# Patient Record
Sex: MALE | Race: WHITE | NOT HISPANIC OR LATINO | Employment: FULL TIME | URBAN - METROPOLITAN AREA
[De-identification: names, ages, dates, MRNs, and addresses within clinical notes are randomized per-mention and may not be internally consistent; named-entity substitution may affect disease eponyms.]

---

## 2023-03-26 ENCOUNTER — OFFICE VISIT (OUTPATIENT)
Dept: URGENT CARE | Facility: URGENT CARE | Age: 58
End: 2023-03-26
Payer: COMMERCIAL

## 2023-03-26 VITALS
DIASTOLIC BLOOD PRESSURE: 64 MMHG | HEART RATE: 63 BPM | OXYGEN SATURATION: 96 % | SYSTOLIC BLOOD PRESSURE: 114 MMHG | TEMPERATURE: 97.2 F

## 2023-03-26 DIAGNOSIS — R22.31 NODULE OF FINGER, RIGHT: ICD-10-CM

## 2023-03-26 DIAGNOSIS — M79.89 FINGER SWELLING: Primary | ICD-10-CM

## 2023-03-26 DIAGNOSIS — L08.9 FINGER INFECTION: ICD-10-CM

## 2023-03-26 LAB
BASOPHILS # BLD AUTO: 0.1 10E3/UL (ref 0–0.2)
BASOPHILS NFR BLD AUTO: 1 %
EOSINOPHIL # BLD AUTO: 0.2 10E3/UL (ref 0–0.7)
EOSINOPHIL NFR BLD AUTO: 4 %
ERYTHROCYTE [DISTWIDTH] IN BLOOD BY AUTOMATED COUNT: 12.7 % (ref 10–15)
ERYTHROCYTE [SEDIMENTATION RATE] IN BLOOD BY WESTERGREN METHOD: 6 MM/HR (ref 0–20)
HCT VFR BLD AUTO: 43.5 % (ref 40–53)
HGB BLD-MCNC: 14.5 G/DL (ref 13.3–17.7)
LYMPHOCYTES # BLD AUTO: 1.6 10E3/UL (ref 0.8–5.3)
LYMPHOCYTES NFR BLD AUTO: 29 %
MCH RBC QN AUTO: 30.8 PG (ref 26.5–33)
MCHC RBC AUTO-ENTMCNC: 33.3 G/DL (ref 31.5–36.5)
MCV RBC AUTO: 92 FL (ref 78–100)
MONOCYTES # BLD AUTO: 0.8 10E3/UL (ref 0–1.3)
MONOCYTES NFR BLD AUTO: 14 %
NEUTROPHILS # BLD AUTO: 2.8 10E3/UL (ref 1.6–8.3)
NEUTROPHILS NFR BLD AUTO: 52 %
PLATELET # BLD AUTO: 184 10E3/UL (ref 150–450)
RBC # BLD AUTO: 4.71 10E6/UL (ref 4.4–5.9)
WBC # BLD AUTO: 5.4 10E3/UL (ref 4–11)

## 2023-03-26 PROCEDURE — 84550 ASSAY OF BLOOD/URIC ACID: CPT | Performed by: PHYSICIAN ASSISTANT

## 2023-03-26 PROCEDURE — 99204 OFFICE O/P NEW MOD 45 MIN: CPT | Performed by: PHYSICIAN ASSISTANT

## 2023-03-26 PROCEDURE — 85025 COMPLETE CBC W/AUTO DIFF WBC: CPT | Performed by: PHYSICIAN ASSISTANT

## 2023-03-26 PROCEDURE — 86140 C-REACTIVE PROTEIN: CPT | Performed by: PHYSICIAN ASSISTANT

## 2023-03-26 PROCEDURE — 85652 RBC SED RATE AUTOMATED: CPT | Performed by: PHYSICIAN ASSISTANT

## 2023-03-26 PROCEDURE — 36415 COLL VENOUS BLD VENIPUNCTURE: CPT | Performed by: PHYSICIAN ASSISTANT

## 2023-03-26 RX ORDER — SULFAMETHOXAZOLE/TRIMETHOPRIM 800-160 MG
1 TABLET ORAL
COMMUNITY
Start: 2023-03-20

## 2023-03-26 NOTE — PROGRESS NOTES
ASSESSMENT/PLAN:    (M79.89) Finger swelling  (primary encounter diagnosis)    MDM: Waxing and waning chronic episodes of redness, swelling, nodules, and open sores on multiple digits right hand, often associated with itching, in a 57 year old male for over one year. I suspect he may have an underlying dermatologic or rheumatologic etiology.     Additionally, I do suspect some secondary local infection of the skin and subcutaneous tissue surrounding the open lesions. As he has not had any response to the Bactrim prescribed by another provider 6 days ago. I have advised he may stop the Bactrim, and will try a 7 day course of Augmentin.      I did consider cellulitis today--although his history and exam is not highly suggestive for cellulitis. His CBD with differential is within normal limits. To start some screen for inflammatory conditions, I ordered a Sed Rate (which is normal). I also ordered a CRP and Uric Acid (which are still pending at this time). Criteria for urgent and emergent follow-up is reviewed.     I provided referrals for patient to be evaluated further by both dermatology and rheumatology in hopes this will help provide a definitive diagnoses and treatment for his prolonged symptoms.     All above is reviewed with patient and his wife. Additionally, I provided the below patient discharge summary and results of today's completed test results to hand carry to pending referral appointments.      Plan: CBC with Platelets & Differential, ESR:         Erythrocyte sedimentation rate, Uric acid, CRP,        inflammation, Adult Dermatology Referral, Adult        Rheumatology  Referral    Discharge Summary-    March 26, 2023 [facility] Plan:     As we discussed today, it seems more probable to me that the intermittent redness, swelling, nodules and open skin lesions on the fingers of your right hand, for the past 1 year to 13 months, is likely to be related to an underlying inflammatory skin  condition or an underlying rheumatologic condition.      In addition to the above, I do suspect you have some element of secondary bacterial skin infection surrounding your open skin lesions. For this reason, I have prescribed a 7 day course of Augmentin antibiotic. You can stop the Bactrim you were prescribed by another provider.     I have placed 2 referrals for you today (one with a dermatologist and one with a rheumatologist).     Your complete blood count results and one of the 2 inflammatory markers I ordered for you today (called a Sed Rate) are normal.     I have also ordered a uric acid blood test (screens for gout) and a second inflammatory marker blood test (called a CRP). Those results should be back in the next 2-3 days.     If you have any spreading or worsening of your current symptoms, if you develop fever, or new symptoms, you should be seen again for re-evaluation.       Results for orders placed or performed in visit on 03/26/23   ESR: Erythrocyte sedimentation rate     Status: Normal   Result Value Ref Range    Erythrocyte Sedimentation Rate 6 0 - 20 mm/hr   CBC with platelets and differential     Status: None   Result Value Ref Range    WBC Count 5.4 4.0 - 11.0 10e3/uL    RBC Count 4.71 4.40 - 5.90 10e6/uL    Hemoglobin 14.5 13.3 - 17.7 g/dL    Hematocrit 43.5 40.0 - 53.0 %    MCV 92 78 - 100 fL    MCH 30.8 26.5 - 33.0 pg    MCHC 33.3 31.5 - 36.5 g/dL    RDW 12.7 10.0 - 15.0 %    Platelet Count 184 150 - 450 10e3/uL    % Neutrophils 52 %    % Lymphocytes 29 %    % Monocytes 14 %    % Eosinophils 4 %    % Basophils 1 %    Absolute Neutrophils 2.8 1.6 - 8.3 10e3/uL    Absolute Lymphocytes 1.6 0.8 - 5.3 10e3/uL    Absolute Monocytes 0.8 0.0 - 1.3 10e3/uL    Absolute Eosinophils 0.2 0.0 - 0.7 10e3/uL    Absolute Basophils 0.1 0.0 - 0.2 10e3/uL   CBC with Platelets & Differential     Status: None    Narrative    The following orders were created for panel order CBC with Platelets &  Differential.  Procedure                               Abnormality         Status                     ---------                               -----------         ------                     CBC with platelets and d...[[id number]]                      Final result                 Please view results for these tests on the individual orders.       (R22.31) Nodule of finger, right  Plan: CBC with Platelets & Differential, ESR:         Erythrocyte sedimentation rate, Uric acid, CRP,        inflammation, Adult Dermatology Referral, Adult        Rheumatology  Referral    (L08.9) Finger infection  Plan: amoxicillin-clavulanate (AUGMENTIN) 875-125 MG         tablet    ------------------------------      Andrea Pete is a very pleasant 57 year old male who presents to urgent care today, accompanied by his wife, for evaluation of chronic, intermittent, episodes of right index finger, right middle finger, and sometimes right ring finger generalized swelling of entire finger, scattered hard nodules, and open sores.     Patient states the these symptoms have been coming and going for over one year. He reports his first episode was in February of 2022. He has been seen my multiple medical providers over the past 13 months or so, and has been prescribed multiple antibiotics (which he states sometimes seemed to help and sometimes did nothing). He is unable to recall the names of any of the antibiotics he has been prescribed--other than the one he was prescribed 6 days ago (Bactrim).     Patient states he developed another flare up about a week or so ago. He was seen by his wellness clinic provider and prescribed a 2 week course of Bactrim. He has been taking the Bactrim for 6 days now, and does not note any improvement, prompting his visit here today.     Other treatment tried: Aquaphor     Associated symptoms: Intermittently itchy. No finger joint pain. No purulent drainage. No associated loss of hand/finger function. No  fever or systemic symptoms. Patient sates he otherwise feels generally well.     No known prior history of gout, rheumatoid or other inflammatory arthritis, diabetes, or other known autoimmune disorders.     ROS:   CONSTITUTIONAL: No acute onset fever,chills or severe weakenss  CARDIAC: No acute onset chest pain or shortness of breath   RESP: No acute onset cough, chest pain or shortness of breath   GI: No acute onset abdominal pain. No acute onset nausea, vomiting or diarrhea   SKIN: positive as per HPI. No acute  rash or lesions elsewhere   NEURO: No acute onset headaches, neck stiffness or lethargy.   RHEUM: Positive as per above. No associated acute onset red, warm or swollen joints elewhere        No past medical history on file.    There is no problem list on file for this patient.    Current Outpatient Medications   Medication     sulfamethoxazole-trimethoprim (BACTRIM DS) 800-160 MG tablet     No current facility-administered medications for this visit.     No Known Allergies     OBJECTIVE:  /64   Pulse 63   Temp 97.2  F (36.2  C) (Tympanic)   SpO2 96%     GENERAL:  Very pleasant, comfortable and generally well appearing.    RIGHT HAND: Positive for erythematous skin right hand and fingers--primarily swelling is noted along the dorsal aspect, radial side and ulnar side of index, middle and ring finger. There are also scattered, firm, nodules on phalanges and scattered, superficial open lesions. No purulent or bloody drainage. No fluctuant pockets to incise and drain.     Patient demonstrates full range of motion right hand and all fingers of right hand. Good  strength is also demonstrated.     Sensation: Sensation to light touch is confirmed along palmar surface, dorsal surface, ulnar aspect, radial aspect, and fingertips of each digit.   Circulation. Good/brisk radial and brachial pulses.Good, brisk, CRT on each digiti of right hand.      NEURO: Alert and oriented.  Normal speech and mentation.   CN II/XII grossly intact.  Gait within normal limits.      LAB:     Results for orders placed or performed in visit on 03/26/23   ESR: Erythrocyte sedimentation rate     Status: Normal   Result Value Ref Range    Erythrocyte Sedimentation Rate 6 0 - 20 mm/hr   CBC with platelets and differential     Status: None   Result Value Ref Range    WBC Count 5.4 4.0 - 11.0 10e3/uL    RBC Count 4.71 4.40 - 5.90 10e6/uL    Hemoglobin 14.5 13.3 - 17.7 g/dL    Hematocrit 43.5 40.0 - 53.0 %    MCV 92 78 - 100 fL    MCH 30.8 26.5 - 33.0 pg    MCHC 33.3 31.5 - 36.5 g/dL    RDW 12.7 10.0 - 15.0 %    Platelet Count 184 150 - 450 10e3/uL    % Neutrophils 52 %    % Lymphocytes 29 %    % Monocytes 14 %    % Eosinophils 4 %    % Basophils 1 %    Absolute Neutrophils 2.8 1.6 - 8.3 10e3/uL    Absolute Lymphocytes 1.6 0.8 - 5.3 10e3/uL    Absolute Monocytes 0.8 0.0 - 1.3 10e3/uL    Absolute Eosinophils 0.2 0.0 - 0.7 10e3/uL    Absolute Basophils 0.1 0.0 - 0.2 10e3/uL   CBC with Platelets & Differential     Status: None    Narrative    The following orders were created for panel order CBC with Platelets & Differential.  Procedure                               Abnormality         Status                     ---------                               -----------         ------                     CBC with platelets and d...[[id number]]                      Final result                 Please view results for these tests on the individual orders.

## 2023-03-27 ENCOUNTER — TELEPHONE (OUTPATIENT)
Dept: FAMILY MEDICINE | Facility: CLINIC | Age: 58
End: 2023-03-27
Payer: COMMERCIAL

## 2023-03-27 LAB
CRP SERPL-MCNC: <3 MG/L
URATE SERPL-MCNC: 4 MG/DL (ref 3.4–7)

## 2023-03-27 NOTE — TELEPHONE ENCOUNTER
S/w wife and scheduled appt at  with Dr. Fernández on Wednesday 4/5 at 1:45 pm.  Wife will call back if appt does not work.    Nazia STRANGE RN  [facility]  [phone]

## 2023-03-27 NOTE — PATIENT INSTRUCTIONS
March 26, 2023 [facility] Plan:     As we discussed today, it seems more probable to me that the intermittent redness, swelling, nodules and open skin lesions on the fingers of your right hand, for the past 1 year to 13 months, is likely to be related to an underlying inflammatory skin condition or an underlying rheumatologic condition.      In addition to the above, I do suspect you have some element of secondary bacterial skin infection surrounding your open skin lesions. For this reason, I have prescribed a 7 day course of Augmentin antibiotic. You can stop the Bactrim you were prescribed by another provider.     I have placed 2 referrals for you today (one with a dermatologist and one with a rheumatologist).     Your complete blood count results and one of the 2 inflammatory markers I ordered for you today (called a Sed Rate) are normal.     I have also ordered a uric acid blood test (screens for gout) and a second inflammatory marker blood test (called a CRP). Those results should be back in the next 2-3 days.     If you have any spreading or worsening of your current symptoms, if you develop fever, or new symptoms, you should be seen again for re-evaluation.       Results for orders placed or performed in visit on 03/26/23   ESR: Erythrocyte sedimentation rate     Status: Normal   Result Value Ref Range    Erythrocyte Sedimentation Rate 6 0 - 20 mm/hr   CBC with platelets and differential     Status: None   Result Value Ref Range    WBC Count 5.4 4.0 - 11.0 10e3/uL    RBC Count 4.71 4.40 - 5.90 10e6/uL    Hemoglobin 14.5 13.3 - 17.7 g/dL    Hematocrit 43.5 40.0 - 53.0 %    MCV 92 78 - 100 fL    MCH 30.8 26.5 - 33.0 pg    MCHC 33.3 31.5 - 36.5 g/dL    RDW 12.7 10.0 - 15.0 %    Platelet Count 184 150 - 450 10e3/uL    % Neutrophils 52 %    % Lymphocytes 29 %    % Monocytes 14 %    % Eosinophils 4 %    % Basophils 1 %    Absolute Neutrophils 2.8 1.6 - 8.3 10e3/uL    Absolute Lymphocytes 1.6 0.8 - 5.3  10e3/uL    Absolute Monocytes 0.8 0.0 - 1.3 10e3/uL    Absolute Eosinophils 0.2 0.0 - 0.7 10e3/uL    Absolute Basophils 0.1 0.0 - 0.2 10e3/uL   CBC with Platelets & Differential     Status: None    Narrative    The following orders were created for panel order CBC with Platelets & Differential.  Procedure                               Abnormality         Status                     ---------                               -----------         ------                     CBC with platelets and d...[[id number]]                      Final result                 Please view results for these tests on the individual orders.

## 2023-03-27 NOTE — TELEPHONE ENCOUNTER
This encounter is being sent to inform the clinic that this patient has a referral from PATRICK SANCHEZ for the diagnoses of M79.89 (ICD-10-CM) - Finger swelling /R22.31 (ICD-10-CM) - Nodule of finger, right and has requested that this patient be seen within 1-2 Weeks.  Based on the availability of our provider(s), we are unable to accommodate this request.      Were all sites offered this patient?  Yes      Does scheduling algorithm request to schedule next available?  Patient appointment has not been scheduled.  Please review the referral request for accommodation and contact the patient.  If unable to accommodate, please resubmit a referral and indicate a preferred partner or affiliate location using Provider Finder or Scheduling Instructions field.      Next available appointment in July. Okay to call Pt or wife with next available appointment scheduled and leave voicemail with details.

## 2023-04-05 ENCOUNTER — OFFICE VISIT (OUTPATIENT)
Dept: DERMATOLOGY | Facility: CLINIC | Age: 58
End: 2023-04-05
Payer: COMMERCIAL

## 2023-04-05 DIAGNOSIS — R21 RASH: Primary | ICD-10-CM

## 2023-04-05 DIAGNOSIS — L29.9 PRURITUS: ICD-10-CM

## 2023-04-05 PROCEDURE — 36415 COLL VENOUS BLD VENIPUNCTURE: CPT | Performed by: STUDENT IN AN ORGANIZED HEALTH CARE EDUCATION/TRAINING PROGRAM

## 2023-04-05 PROCEDURE — 86038 ANTINUCLEAR ANTIBODIES: CPT | Performed by: STUDENT IN AN ORGANIZED HEALTH CARE EDUCATION/TRAINING PROGRAM

## 2023-04-05 PROCEDURE — 99204 OFFICE O/P NEW MOD 45 MIN: CPT | Performed by: STUDENT IN AN ORGANIZED HEALTH CARE EDUCATION/TRAINING PROGRAM

## 2023-04-05 PROCEDURE — 86431 RHEUMATOID FACTOR QUANT: CPT | Performed by: STUDENT IN AN ORGANIZED HEALTH CARE EDUCATION/TRAINING PROGRAM

## 2023-04-05 RX ORDER — CLOBETASOL PROPIONATE 0.5 MG/G
OINTMENT TOPICAL 2 TIMES DAILY
Qty: 60 G | Refills: 1 | Status: SHIPPED | OUTPATIENT
Start: 2023-04-05

## 2023-04-05 ASSESSMENT — PAIN SCALES - GENERAL: PAINLEVEL: NO PAIN (0)

## 2023-04-05 NOTE — LETTER
4/5/2023         RE: Andrea Pete  [address]  [place] [address]        Dear Colleague,    Thank you for referring your patient, Andrea Pete, to the [facility]. Please see a copy of my visit note below.    [facility] Dermatology Note    Encounter Date: Apr 5, 2023    Dermatology Problem List:  -   ______________________________________    Impression/Plan:  Andrea was seen today for derm problem.    Diagnoses and all orders for this visit:    Pruritus  Rash  -     clobetasol (TEMOVATE) 0.05 % external ointment; Apply topically 2 times daily  -     Rheumatoid factor  -     Anti Nuclear Desiree IgG by IFA with Reflex  -Patient presenting for recurrent erythematous edematous pruritic plaques on dorsal fingers of the right hand which have recurred intermittently typically during colder months here in [place] in February November and March  - The rashes typically last several days to weeks typically these presented to different doctors offices and been given antibiotics and the lesions improved.  It is unclear whether the lesions improved because of the antibiotics or because they are going to improve anyway  - Discussed the uncertainty related to the diagnosis given that its not active today, note placed in the blue sticky of his chart to allow patient to be worked in immediately should the rash recur so we can biopsy a fresh lesion  - However based on the history, and the appearance of the lesions and pictures on the patient's phone I think that the diagnosis is most consistent with pernio  - Patient does endorse a history of frequently being outside and doing activities during which his hands are frequently cold  - Discussed idiopathic perniosis versus perniosis associated with systemic diseases, patient's review of systems is negative for associated conditions like lupus rheumatoid arthritis etc.  - Rheumatoid factor and DEMETRIUS, if negative reassuring that  more worrisome things are not going on, however if positive will change how we think about the case very much because patient lacks the clinical symptoms consistent with conditions in which those antibodies are positive  -Interesting feature of this case said that the lesions have been persistently confined to the right hand, on exam today the right hand is obviously colder than the left hand.  Patient does deny symptoms of Raynaud's phenomenon and nailfold capillaries are normal under dermoscopy    Other orders  -     Adult Dermatology Referral          Follow-up when rash is active.       Staff Involved:  Staff Only    Xander Fernández MD   of Dermatology  Department of Dermatology  [facility]      CC:   Chief Complaint   Patient presents with     Derm Problem       History of Present Illness:  Mr. Andrea Pete is a 57 year old male who presents as a new patient.    Waxing and waning swelling and redness of fingers     In February of 2022 had episode of swelling and ws given antibiotcis and it cleared up.     Had another episode that appeared in November and was treated w/ bactrimn in December    Most recent episode in march was given 2 weeks of antibiotics     Labs:      Physical exam:  Vitals: There were no vitals taken for this visit.  GEN: well developed, well-nourished, in no acute distress, in a pleasant mood.     SKIN: Marino phototype 1  - Full skin, which includes the head/face, both arms, chest, back, abdomen,both legs, genitalia and/or groin buttocks, digits and/or nails, was examined.  -Acral cyanosis right hand, with postinflammatory pigment alteration on the dorsal aspects of the right hand where the rash was previously  - No other lesions of concern on areas examined.     Past Medical History:   History reviewed. No pertinent past medical history.  No past surgical history on file.    Social History:   reports that he has never smoked. He has  never used smokeless tobacco.    Family History:  History reviewed. No pertinent family history.    Medications:  Current Outpatient Medications   Medication Sig Dispense Refill     clobetasol (TEMOVATE) 0.05 % external ointment Apply topically 2 times daily 60 g 1     sulfamethoxazole-trimethoprim (BACTRIM DS) 800-160 MG tablet Take 1 tablet by mouth 2 times daily (Patient not taking: Reported on 4/5/2023)       No Known Allergies                Again, thank you for allowing me to participate in the care of your patient.        Sincerely,        Xander Fernández MD

## 2023-04-05 NOTE — PROGRESS NOTES
[facility] Dermatology Note    Encounter Date: Apr 5, 2023    Dermatology Problem List:  -   ______________________________________    Impression/Plan:  Andrea was seen today for derm problem.    Diagnoses and all orders for this visit:    Pruritus  Rash  -     clobetasol (TEMOVATE) 0.05 % external ointment; Apply topically 2 times daily  -     Rheumatoid factor  -     Anti Nuclear Desiree IgG by IFA with Reflex  -Patient presenting for recurrent erythematous edematous pruritic plaques on dorsal fingers of the right hand which have recurred intermittently typically during colder months here in [place] in February November and March  - The rashes typically last several days to weeks typically these presented to different doctors offices and been given antibiotics and the lesions improved.  It is unclear whether the lesions improved because of the antibiotics or because they are going to improve anyway  - Discussed the uncertainty related to the diagnosis given that its not active today, note placed in the blue sticky of his chart to allow patient to be worked in immediately should the rash recur so we can biopsy a fresh lesion  - However based on the history, and the appearance of the lesions and pictures on the patient's phone I think that the diagnosis is most consistent with pernio  - Patient does endorse a history of frequently being outside and doing activities during which his hands are frequently cold  - Discussed idiopathic perniosis versus perniosis associated with systemic diseases, patient's review of systems is negative for associated conditions like lupus rheumatoid arthritis etc.  - Rheumatoid factor and DEMETRIUS, if negative reassuring that more worrisome things are not going on, however if positive will change how we think about the case very much because patient lacks the clinical symptoms consistent with conditions in which those antibodies are positive  -Interesting feature of this case  said that the lesions have been persistently confined to the right hand, on exam today the right hand is obviously colder than the left hand.  Patient does deny symptoms of Raynaud's phenomenon and nailfold capillaries are normal under dermoscopy    Other orders  -     Adult Dermatology Referral          Follow-up when rash is active.       Staff Involved:  Staff Only    Xander Fernández MD   of Dermatology  Department of Dermatology  [facility]      CC:   Chief Complaint   Patient presents with     Derm Problem       History of Present Illness:  Mr. Andrea Pete is a 57 year old male who presents as a new patient.    Waxing and waning swelling and redness of fingers     In February of 2022 had episode of swelling and ws given antibiotcis and it cleared up.     Had another episode that appeared in November and was treated w/ bactrimn in December    Most recent episode in march was given 2 weeks of antibiotics     Labs:      Physical exam:  Vitals: There were no vitals taken for this visit.  GEN: well developed, well-nourished, in no acute distress, in a pleasant mood.     SKIN: Marino phototype 1  - Full skin, which includes the head/face, both arms, chest, back, abdomen,both legs, genitalia and/or groin buttocks, digits and/or nails, was examined.  -Acral cyanosis right hand, with postinflammatory pigment alteration on the dorsal aspects of the right hand where the rash was previously  - No other lesions of concern on areas examined.     Past Medical History:   History reviewed. No pertinent past medical history.  No past surgical history on file.    Social History:   reports that he has never smoked. He has never used smokeless tobacco.    Family History:  History reviewed. No pertinent family history.    Medications:  Current Outpatient Medications   Medication Sig Dispense Refill     clobetasol (TEMOVATE) 0.05 % external ointment Apply topically 2 times daily  60 g 1     sulfamethoxazole-trimethoprim (BACTRIM DS) 800-160 MG tablet Take 1 tablet by mouth 2 times daily (Patient not taking: Reported on 4/5/2023)       No Known Allergies

## 2023-04-05 NOTE — PATIENT INSTRUCTIONS
"Your rash looks and sounds consistent with a condition called perniosis or chillblains. This is a benign condition often triggered by exposure to moisture and cold. It can typically be managed by keeping your hands dry and warm and use of topical steroids as necessary. If and when you develop another flare, message me right away on mychart and say you would like to be worked in for a biopsy per the \"blue sticky\" in your chart.     Rarely perniosis can be associated with autoimmune conditions. You do not have any symptoms that are consistent with autoimmune disease. We have ordered some blood work, that if negative will be very reassuring of that. If positive, it wont change how suspicious we are for autoimmune disease at this time given you lack the associated systemic symptoms   "

## 2023-04-06 LAB
ANA SER QL IF: NEGATIVE
RHEUMATOID FACT SER NEPH-ACNC: <7 IU/ML

## 2023-08-26 ENCOUNTER — HEALTH MAINTENANCE LETTER (OUTPATIENT)
Age: 58
End: 2023-08-26

## 2024-10-19 ENCOUNTER — HEALTH MAINTENANCE LETTER (OUTPATIENT)
Age: 59
End: 2024-10-19

## 2024-10-25 SDOH — HEALTH STABILITY: PHYSICAL HEALTH: ON AVERAGE, HOW MANY DAYS PER WEEK DO YOU ENGAGE IN MODERATE TO STRENUOUS EXERCISE (LIKE A BRISK WALK)?: 5 DAYS

## 2024-10-25 SDOH — HEALTH STABILITY: PHYSICAL HEALTH: ON AVERAGE, HOW MANY MINUTES DO YOU ENGAGE IN EXERCISE AT THIS LEVEL?: 20 MIN

## 2024-10-25 ASSESSMENT — SOCIAL DETERMINANTS OF HEALTH (SDOH): HOW OFTEN DO YOU GET TOGETHER WITH FRIENDS OR RELATIVES?: ONCE A WEEK

## 2024-10-30 ENCOUNTER — OFFICE VISIT (OUTPATIENT)
Dept: INTERNAL MEDICINE | Facility: CLINIC | Age: 59
End: 2024-10-30
Payer: COMMERCIAL

## 2024-10-30 VITALS
BODY MASS INDEX: 26.76 KG/M2 | TEMPERATURE: 97.8 F | OXYGEN SATURATION: 98 % | HEART RATE: 58 BPM | SYSTOLIC BLOOD PRESSURE: 120 MMHG | WEIGHT: 180.7 LBS | HEIGHT: 69 IN | DIASTOLIC BLOOD PRESSURE: 80 MMHG

## 2024-10-30 DIAGNOSIS — Z13.220 LIPID SCREENING: ICD-10-CM

## 2024-10-30 DIAGNOSIS — R13.19 ESOPHAGEAL DYSPHAGIA: ICD-10-CM

## 2024-10-30 DIAGNOSIS — R79.89 ELEVATED TSH: ICD-10-CM

## 2024-10-30 DIAGNOSIS — Z13.1 SCREENING FOR DIABETES MELLITUS: ICD-10-CM

## 2024-10-30 DIAGNOSIS — Z11.59 NEED FOR HEPATITIS C SCREENING TEST: ICD-10-CM

## 2024-10-30 DIAGNOSIS — Z23 NEED FOR PROPHYLACTIC VACCINATION AND INOCULATION AGAINST INFLUENZA: ICD-10-CM

## 2024-10-30 DIAGNOSIS — Z00.00 ROUTINE GENERAL MEDICAL EXAMINATION AT A HEALTH CARE FACILITY: Primary | ICD-10-CM

## 2024-10-30 LAB
ANION GAP SERPL CALCULATED.3IONS-SCNC: 12 MMOL/L (ref 7–15)
BUN SERPL-MCNC: 16.2 MG/DL (ref 8–23)
CALCIUM SERPL-MCNC: 9.4 MG/DL (ref 8.8–10.4)
CHLORIDE SERPL-SCNC: 102 MMOL/L (ref 98–107)
CHOLEST SERPL-MCNC: 169 MG/DL
CREAT SERPL-MCNC: 1.13 MG/DL (ref 0.67–1.17)
EGFRCR SERPLBLD CKD-EPI 2021: 74 ML/MIN/1.73M2
FASTING STATUS PATIENT QL REPORTED: YES
FASTING STATUS PATIENT QL REPORTED: YES
GLUCOSE SERPL-MCNC: 98 MG/DL (ref 70–99)
HCO3 SERPL-SCNC: 25 MMOL/L (ref 22–29)
HCV AB SERPL QL IA: NONREACTIVE
HDLC SERPL-MCNC: 58 MG/DL
LDLC SERPL CALC-MCNC: 99 MG/DL
NONHDLC SERPL-MCNC: 111 MG/DL
POTASSIUM SERPL-SCNC: 4.6 MMOL/L (ref 3.4–5.3)
SODIUM SERPL-SCNC: 139 MMOL/L (ref 135–145)
T4 FREE SERPL-MCNC: 1.12 NG/DL (ref 0.9–1.7)
TRIGL SERPL-MCNC: 58 MG/DL
TSH SERPL DL<=0.005 MIU/L-ACNC: 4.83 UIU/ML (ref 0.3–4.2)

## 2024-10-30 PROCEDURE — 91320 SARSCV2 VAC 30MCG TRS-SUC IM: CPT | Performed by: INTERNAL MEDICINE

## 2024-10-30 PROCEDURE — 86803 HEPATITIS C AB TEST: CPT | Performed by: INTERNAL MEDICINE

## 2024-10-30 PROCEDURE — 90480 ADMN SARSCOV2 VAC 1/ONLY CMP: CPT | Performed by: INTERNAL MEDICINE

## 2024-10-30 PROCEDURE — 84443 ASSAY THYROID STIM HORMONE: CPT | Performed by: INTERNAL MEDICINE

## 2024-10-30 PROCEDURE — 80061 LIPID PANEL: CPT | Performed by: INTERNAL MEDICINE

## 2024-10-30 PROCEDURE — 80048 BASIC METABOLIC PNL TOTAL CA: CPT | Performed by: INTERNAL MEDICINE

## 2024-10-30 PROCEDURE — 36415 COLL VENOUS BLD VENIPUNCTURE: CPT | Performed by: INTERNAL MEDICINE

## 2024-10-30 PROCEDURE — 99396 PREV VISIT EST AGE 40-64: CPT | Mod: 25 | Performed by: INTERNAL MEDICINE

## 2024-10-30 PROCEDURE — 84439 ASSAY OF FREE THYROXINE: CPT | Performed by: INTERNAL MEDICINE

## 2024-10-30 PROCEDURE — 90673 RIV3 VACCINE NO PRESERV IM: CPT | Performed by: INTERNAL MEDICINE

## 2024-10-30 PROCEDURE — 90471 IMMUNIZATION ADMIN: CPT | Performed by: INTERNAL MEDICINE

## 2024-10-30 NOTE — PROGRESS NOTES
"Preventive Care Visit  [facility]  Trung Castellanos MD, Internal Medicine  Oct 30, 2024      Assessment & Plan     Routine general medical examination at a health care facility  Updated screening, immunizations, prevention.  Please see health maintenance list, care gaps   Will schedule colonoscopy thru [facility]-     Esophageal dysphagia  Several yr hx of intermittent dysphagia. No GERD.  Get EGD to evaluate this   - Adult GI  Referral - Procedure Only; Future    Elevated TSH  Noted in 2023- no symptoms - if elevated might be subclinical hypothryoidism  - TSH with free T4 reflex; Future  - TSH with free T4 reflex    Lipid screening  - Lipid panel reflex to direct LDL Fasting; Future  - Lipid panel reflex to direct LDL Fasting    Screening for diabetes mellitus  - Basic metabolic panel; Future  - Basic metabolic panel    Need for prophylactic vaccination and inoculation against influenza      Need for hepatitis C screening test  - Hepatitis C Screen Reflex to HCV RNA Quant and Genotype; Future  - Hepatitis C Screen Reflex to HCV RNA Quant and Genotype    Patient has been advised of split billing requirements and indicates understanding: Yes        BMI  Estimated body mass index is 26.68 kg/m  as calculated from the following:    Height as of this encounter: 1.753 m (5' 9\").    Weight as of this encounter: 82 kg (180 lb 11.2 oz).       Counseling  Appropriate preventive services were addressed with this patient via screening, questionnaire, or discussion as appropriate for fall prevention, nutrition, physical activity, Tobacco-use cessation, social engagement, weight loss and cognition.  Checklist reviewing preventive services available has been given to the patient.  Reviewed patient's diet, addressing concerns and/or questions.   He is at risk for psychosocial distress and has been provided with information to reduce risk.       Regular exercise  See Patient Instructions    Subjective "   Andrea is a 59 year old, presenting for the following:  Physical and Imm/Inj (Flu Shot)        10/30/2024    10:38 AM   Additional Questions   Roomed by Jacinta PASCUAL CMA   Accompanied by self          HPI  No complaints  Previously seen at [facility]  Has had some intermittent dysphagia since 2022. No other red flag sx- no heartburn.   Due for colonoscopy- has order from [facility] to schedule.           Health Care Directive  Patient does not have a Health Care Directive: Discussed advance care planning with patient; information given to patient to review.      10/25/2024   General Health   How would you rate your overall physical health? Good   Feel stress (tense, anxious, or unable to sleep) Only a little      (!) STRESS CONCERN      10/25/2024   Nutrition   Three or more servings of calcium each day? Yes   Diet: Regular (no restrictions)    Low fat/cholesterol   How many servings of fruit and vegetables per day? 4 or more   How many sweetened beverages each day? 0-1       Multiple values from one day are sorted in reverse-chronological order         10/25/2024   Exercise   Days per week of moderate/strenous exercise 5 days   Average minutes spent exercising at this level 20 min            10/25/2024   Social Factors   Frequency of gathering with friends or relatives Once a week   Worry food won't last until get money to buy more No   Food not last or not have enough money for food? No   Do you have housing? (Housing is defined as stable permanent housing and does not include staying ouside in a car, in a tent, in an abandoned building, in an overnight shelter, or couch-surfing.) Yes   Are you worried about losing your housing? No   Lack of transportation? No   Unable to get utilities (heat,electricity)? No            10/25/2024   Fall Risk   Fallen 2 or more times in the past year? No    Trouble with walking or balance? No        Patient-reported          10/25/2024   Dental   Dentist two times every year?  "Yes            10/25/2024   TB Screening   Were you born outside of the [place]? No            Today's PHQ-2 Score:       10/29/2024     7:06 PM   PHQ-2 ( 1999 [employer])   Q1: Little interest or pleasure in doing things 0    Q2: Feeling down, depressed or hopeless 0    PHQ-2 Score 0    Q1: Little interest or pleasure in doing things Not at all   Q2: Feeling down, depressed or hopeless Not at all   PHQ-2 Score 0       Patient-reported           10/25/2024   Substance Use   Alcohol more than 3/day or more than 7/wk No   Do you use any other substances recreationally? No        Social History     Tobacco Use    Smoking status: Never    Smokeless tobacco: Never   Vaping Use    Vaping status: Never Used   Substance Use Topics    Alcohol use: Not Currently    Drug use: Never             10/25/2024   One time HIV Screening   Previous HIV test? I don't know          10/25/2024   STI Screening   New sexual partner(s) since last STI/HIV test? No      Last PSA: No results found for: \"PSA\"  ASCVD Risk   The ASCVD Risk score (Tom TOVAR, et al., 2019) failed to calculate for the following reasons:    Cannot find a previous HDL lab    Cannot find a previous total cholesterol lab           Reviewed and updated as needed this visit by Provider   Tobacco     Med Hx  Surg Hx  Fam Hx  Soc Hx Sexual Activity          Lab work is in process  Labs reviewed in EPIC         Objective    Exam  /80   Pulse 58   Temp 97.8  F (36.6  C) (Temporal)   Ht 1.753 m (5' 9\")   Wt 82 kg (180 lb 11.2 oz)   SpO2 98%   BMI 26.68 kg/m     Estimated body mass index is 26.68 kg/m  as calculated from the following:    Height as of this encounter: 1.753 m (5' 9\").    Weight as of this encounter: 82 kg (180 lb 11.2 oz).    Physical Exam  GENERAL: alert and no distress  EYES: Eyes grossly normal to inspection, PERRL and conjunctivae and sclerae normal  HENT: ear canals and TM's normal, nose and mouth without ulcers or lesions  NECK: no " adenopathy, no asymmetry, masses, or scars  RESP: lungs clear to auscultation - no rales, rhonchi or wheezes  CV: regular rate and rhythm, normal S1 S2, no S3 or S4, no murmur, click or rub, no peripheral edema  ABDOMEN: soft, nontender, no hepatosplenomegaly, no masses and bowel sounds normal  MS: no gross musculoskeletal defects noted, no edema  SKIN: no suspicious lesions or rashes  NEURO: Normal strength and tone, mentation intact and speech normal  PSYCH: mentation appears normal, affect normal/bright  LYMPH: no cervical adenopathy        Signed Electronically by: Trung Castellanos MD

## 2024-10-30 NOTE — PATIENT INSTRUCTIONS
Patient Education   Preventive Care Advice   This is general advice given by our system to help you stay healthy. However, your care team may have specific advice just for you. Please talk to your care team about your preventive care needs.  Nutrition  Eat 5 or more servings of fruits and vegetables each day.  Try wheat bread, brown rice and whole grain pasta (instead of white bread, rice, and pasta).  Get enough calcium and vitamin D. Check the label on foods and aim for 100% of the RDA (recommended daily allowance).  Lifestyle  Exercise at least 150 minutes each week  (30 minutes a day, 5 days a week).  Do muscle strengthening activities 2 days a week. These help control your weight and prevent disease.  No smoking.  Wear sunscreen to prevent skin cancer.  Have a dental exam and cleaning every 6 months.  Yearly exams  See your health care team every year to talk about:  Any changes in your health.  Any medicines your care team has prescribed.  Preventive care, family planning, and ways to prevent chronic diseases.  Shots (vaccines)   HPV shots (up to age 26), if you've never had them before.  Hepatitis B shots (up to age 59), if you've never had them before.  COVID-19 shot: Get this shot when it's due.  Flu shot: Get a flu shot every year.  Tetanus shot: Get a tetanus shot every 10 years.  Pneumococcal, hepatitis A, and RSV shots: Ask your care team if you need these based on your risk.  Shingles shot (for age 50 and up)  General health tests  Diabetes screening:  Starting at age 35, Get screened for diabetes at least every 3 years.  If you are younger than age 35, ask your care team if you should be screened for diabetes.  Cholesterol test: At age 39, start having a cholesterol test every 5 years, or more often if advised.  Bone density scan (DEXA): At age 50, ask your care team if you should have this scan for osteoporosis (brittle bones).  Hepatitis C: Get tested at least once in your life.  STIs (sexually  transmitted infections)  Before age 24: Ask your care team if you should be screened for STIs.  After age 24: Get screened for STIs if you're at risk. You are at risk for STIs (including HIV) if:  You are sexually active with more than one person.  You don't use condoms every time.  You or a partner was diagnosed with a sexually transmitted infection.  If you are at risk for HIV, ask about PrEP medicine to prevent HIV.  Get tested for HIV at least once in your life, whether you are at risk for HIV or not.  Cancer screening tests  Cervical cancer screening: If you have a cervix, begin getting regular cervical cancer screening tests starting at age 21.  Breast cancer scan (mammogram): If you've ever had breasts, begin having regular mammograms starting at age 40. This is a scan to check for breast cancer.  Colon cancer screening: It is important to start screening for colon cancer at age 45.  Have a colonoscopy test every 10 years (or more often if you're at risk) Or, ask your provider about stool tests like a FIT test every year or Cologuard test every 3 years.  To learn more about your testing options, visit:   .  For help making a decision, visit:   https://bit.ly/do40899.  Prostate cancer screening test: If you have a prostate, ask your care team if a prostate cancer screening test (PSA) at age 55 is right for you.  Lung cancer screening: If you are a current or former smoker ages 50 to 80, ask your care team if ongoing lung cancer screenings are right for you.  For informational purposes only. Not to replace the advice of your health care provider. Copyright   2023 [employer]. All rights reserved. Clinically reviewed by the [facility] Transitions Program. Swapbox [id number] - REV 01/24.

## 2024-11-23 ENCOUNTER — MYC MEDICAL ADVICE (OUTPATIENT)
Dept: INTERNAL MEDICINE | Facility: CLINIC | Age: 59
End: 2024-11-23
Payer: COMMERCIAL

## 2024-11-23 DIAGNOSIS — N52.9 IMPOTENCE OF ORGANIC ORIGIN: Primary | ICD-10-CM

## 2024-12-02 RX ORDER — SILDENAFIL 50 MG/1
50 TABLET, FILM COATED ORAL DAILY PRN
Qty: 30 TABLET | Refills: 5 | Status: SHIPPED | OUTPATIENT
Start: 2024-12-02

## 2025-01-31 ENCOUNTER — TRANSFERRED RECORDS (OUTPATIENT)
Dept: HEALTH INFORMATION MANAGEMENT | Facility: CLINIC | Age: 60
End: 2025-01-31
Payer: COMMERCIAL

## 2025-02-17 ENCOUNTER — VIRTUAL VISIT (OUTPATIENT)
Dept: INTERNAL MEDICINE | Facility: CLINIC | Age: 60
End: 2025-02-17
Payer: COMMERCIAL

## 2025-02-17 DIAGNOSIS — K20.0 EOSINOPHILIC ESOPHAGITIS: Primary | ICD-10-CM

## 2025-02-17 PROCEDURE — 98006 SYNCH AUDIO-VIDEO EST MOD 30: CPT | Performed by: INTERNAL MEDICINE

## 2025-02-17 RX ORDER — OMEPRAZOLE 20 MG/1
20 CAPSULE, DELAYED RELEASE ORAL 2 TIMES DAILY
Qty: 180 CAPSULE | Refills: 0 | Status: SHIPPED | OUTPATIENT
Start: 2025-02-17 | End: 2025-05-18

## 2025-02-17 NOTE — PROGRESS NOTES
Andrea is a 59 year old who is being evaluated via a billable video visit.    How would you like to obtain your AVS? MyChart  If the video visit is dropped, the invitation should be resent by: Text to cell phone: [phone]  Will anyone else be joining your video visit? No      Assessment & Plan     Eosinophilic esophagitis  Start PPI x 8 weeks dosing twice daily.  Repeat EGD at that time.  If normal consider continued long-term PPI use.  If significant eosinophilia  on biopsies remains then discuss either  food elimination diets versus drug  - omeprazole (PRILOSEC) 20 MG DR capsule; Take 1 capsule (20 mg) by mouth 2 times daily.  - Adult GI  Referral - Procedure Only; Future    See Patient Instructions    The longitudinal plan of care for the diagnosis(es)/condition(s) as documented were addressed during this visit. Due to the added complexity in care, I will continue to support Andrea in the subsequent management and with ongoing continuity of care.    Subjective   Andrea is a 59 year old, presenting for the following health issues:  Follow Up (Endoscopy )    History of Present Illness       Reason for visit:  Follow up from endoscopy that showed food alergies    He eats 4 or more servings of fruits and vegetables daily.He consumes 1 sweetened beverage(s) daily.He exercises with enough effort to increase his heart rate 20 to 29 minutes per day.  He exercises with enough effort to increase his heart rate 4 days per week.   He is taking medications regularly.                   Objective           Vitals:  No vitals were obtained today due to virtual visit.    Physical Exam   GENERAL: alert and no distress          Video-Visit Details    Type of service:  Video Visit   Originating Location (pt. Location): Home    Distant Location (provider location):  On-site  Platform used for Video Visit: Delonte  Signed Electronically by: Trung Castellanos MD

## 2025-02-20 ENCOUNTER — TELEPHONE (OUTPATIENT)
Dept: GASTROENTEROLOGY | Facility: CLINIC | Age: 60
End: 2025-02-20
Payer: COMMERCIAL

## 2025-02-20 ENCOUNTER — HOSPITAL ENCOUNTER (OUTPATIENT)
Facility: CLINIC | Age: 60
End: 2025-02-20
Attending: INTERNAL MEDICINE | Admitting: INTERNAL MEDICINE
Payer: COMMERCIAL

## 2025-02-20 NOTE — TELEPHONE ENCOUNTER
"Endoscopy Scheduling Screen    Have you had any respiratory illness or flu-like symptoms in the last 10 days?  No    What is your communication preference for Instructions and/or Bowel Prep?   MyChart    What insurance is in the chart?  Other:      Ordering/Referring Provider:   PRINCE LAWTON        (If ordering provider performs procedure, schedule with ordering provider unless otherwise instructed. )    BMI: Estimated body mass index is 26.68 kg/m  as calculated from the following:    Height as of 10/30/24: 1.753 m (5' 9\").    Weight as of 10/30/24: 82 kg (180 lb 11.2 oz).     Sedation Ordered  moderate sedation.   If patient BMI > 50 do not schedule in [facility].    If patient BMI > 45 do not schedule at [facility].    Are you taking methadone or Suboxone?  NO, No RN review required.    Have you been diagnosed and are being treated for severe PTSD or severe anxiety?  NO, No RN review required.    Are you taking any prescription medications for pain 3 or more times per week?   NO, No RN review required.    Do you have a history of malignant hyperthermia?  No    (Females) Are you currently pregnant?   No     Have you been diagnosed or told you have pulmonary hypertension?   No    Do you have an LVAD?  No    Have you been told you have moderate to severe sleep apnea?  No.    Have you been told you have COPD, asthma, or any other lung disease?  No    Do you  have a history of any heart conditions or any upcoming cardiac exams like an echo, angiogram, stress test, or ablation?  No     Have you ever had or are you waiting for an organ transplant?  No. Continue scheduling, no site restrictions.    Have you had a stroke or transient ischemic attack (TIA aka \"mini stroke\") in the last 2 years?   No.    Have you been diagnosed with or been told you have cirrhosis of the liver?   No.    Are you currently on dialysis?   No    Do you need assistance transferring?   No    BMI: Estimated body mass index is 26.68 kg/m  as calculated " "from the following:    Height as of 10/30/24: 1.753 m (5' 9\").    Weight as of 10/30/24: 82 kg (180 lb 11.2 oz).     Is patients BMI > 40 and scheduling location [facility]?  No    Do you take an injectable or oral medication for weight loss or diabetes (excluding insulin)?  No    Do you take the medication Naltrexone?  No    Do you take blood thinners?  No       Prep   Are you currently on dialysis or do you have chronic kidney disease?  No    Do you have a diagnosis of diabetes?  No    Do you have a diagnosis of cystic fibrosis (CF)?  No    On a regular basis do you go 3 -5 days between bowel movements?  No    BMI > 40?  No    Preferred Pharmacy:     [id number] - [place], [place] - [address] AT [facility] [address]  [address] [address]  Phone: [phone] Fax: [phone]      Final Scheduling Details     Procedure scheduled  Upper endoscopy (EGD)    Surgeon:  MARIANNA     Date of procedure:  04/18/2025     Pre-OP / PAC:   No - Not required for this site.    Location  [facility] - Per order.    Sedation   Moderate Sedation - Per order.      Patient Reminders:   You will receive a call from a Nurse to review instructions and health history.  This assessment must be completed prior to your procedure.  Failure to complete the Nurse assessment may result in the procedure being cancelled.      On the day of your procedure, please designate an adult(s) who can drive you home stay with you for the next 24 hours. The medicines used in the exam will make you sleepy. You will not be able to drive.      You cannot take public transportation, ride share services, or non-medical taxi service without a responsible caregiver.  Medical transport services are allowed with the requirement that a responsible caregiver will receive you at your destination.  We require that drivers and caregivers are confirmed prior to your procedure.  "

## 2025-03-05 ENCOUNTER — TELEPHONE (OUTPATIENT)
Dept: GASTROENTEROLOGY | Facility: CLINIC | Age: 60
End: 2025-03-05
Payer: COMMERCIAL

## 2025-03-05 NOTE — TELEPHONE ENCOUNTER
Caller: Andrea Pete     Reason for Reschedule/Cancellation (please be detailed, any staff messages or encounters to note?):   Patient spoke with PCP and decided not to proceed at this time    Did you cancel or rescheduled an EUS procedure? No.    Is screening questionnaire older than 3 months from the reschedule date.   If Yes, please complete screening questionnaire. No    Prior to reschedule please review:  Ordering Provider: PRINCE LAWTON   Sedation Determined: Moderate  Does patient have any ASC Exclusions, please identify?: N    Notes on Cancelled Procedure:  Procedure: Upper Endoscopy [EGD]   Date: 4/18/2025  Location: [facility]; [address] [place] [address]  Surgeon: MARIANNA    Rescheduled: No, Patient spoke with PCP and decided to not proceed with doing the procedure at this time

## 2025-03-22 ENCOUNTER — MYC REFILL (OUTPATIENT)
Dept: INTERNAL MEDICINE | Facility: CLINIC | Age: 60
End: 2025-03-22
Payer: COMMERCIAL

## 2025-03-22 DIAGNOSIS — N52.9 IMPOTENCE OF ORGANIC ORIGIN: ICD-10-CM

## 2025-03-24 RX ORDER — SILDENAFIL 50 MG/1
50 TABLET, FILM COATED ORAL DAILY PRN
Qty: 90 TABLET | Refills: 3 | Status: SHIPPED | OUTPATIENT
Start: 2025-03-24